# Patient Record
Sex: MALE | NOT HISPANIC OR LATINO | URBAN - METROPOLITAN AREA
[De-identification: names, ages, dates, MRNs, and addresses within clinical notes are randomized per-mention and may not be internally consistent; named-entity substitution may affect disease eponyms.]

---

## 2017-02-27 ENCOUNTER — APPOINTMENT (RX ONLY)
Dept: URBAN - METROPOLITAN AREA CLINIC 37 | Facility: CLINIC | Age: 62
Setting detail: DERMATOLOGY
End: 2017-02-27

## 2017-02-27 ENCOUNTER — APPOINTMENT (RX ONLY)
Dept: URBAN - METROPOLITAN AREA CLINIC 38 | Facility: CLINIC | Age: 62
Setting detail: DERMATOLOGY
End: 2017-02-27

## 2017-02-27 DIAGNOSIS — L81.0 POSTINFLAMMATORY HYPERPIGMENTATION: ICD-10-CM

## 2017-02-27 PROCEDURE — ? RECOMMENDATIONS

## 2017-02-27 PROCEDURE — ? COUNSELING

## 2017-02-27 PROCEDURE — 99202 OFFICE O/P NEW SF 15 MIN: CPT

## 2017-02-27 ASSESSMENT — LOCATION SIMPLE DESCRIPTION DERM
LOCATION SIMPLE: LEFT CHEEK
LOCATION SIMPLE: RIGHT CHEEK
LOCATION SIMPLE: UPPER BACK
LOCATION SIMPLE: RIGHT CHEEK
LOCATION SIMPLE: UPPER BACK
LOCATION SIMPLE: LEFT CHEEK

## 2017-02-27 ASSESSMENT — LOCATION ZONE DERM
LOCATION ZONE: FACE
LOCATION ZONE: FACE
LOCATION ZONE: TRUNK
LOCATION ZONE: TRUNK

## 2017-02-27 ASSESSMENT — LOCATION DETAILED DESCRIPTION DERM
LOCATION DETAILED: LEFT CENTRAL MALAR CHEEK
LOCATION DETAILED: RIGHT INFERIOR CENTRAL MALAR CHEEK
LOCATION DETAILED: RIGHT INFERIOR CENTRAL MALAR CHEEK
LOCATION DETAILED: LEFT CENTRAL MALAR CHEEK
LOCATION DETAILED: SUPERIOR THORACIC SPINE
LOCATION DETAILED: SUPERIOR THORACIC SPINE

## 2017-02-27 NOTE — PROCEDURE: RECOMMENDATIONS
Recommendations (Free Text): We discussed the fact that his skin shows changes in pigment that are consistent with postinflammatory change.  This can happen in the aftermath of a rash, and we discussed the various possibilities.  The PIH is \"seborrheic\" in distribution (central face, upper mid back), but could possibly represent an unusual drug reaction particularly to his blood pressure medicine.  Dr. Mike Bedolla evaluated him with me today, and we discussed a plan to try and help him.  We will obtain the results of his previous biopsies, and based on these results, we may contact his primary care doctor about a possible change in his blood pressure medicine if possible.
Recommendation Preamble: The following details were discussed:
Detail Level: Simple

## 2017-02-27 NOTE — PROCEDURE: MIPS QUALITY
Detail Level: Detailed
Quality 110: Preventive Care And Screening: Influenza Immunization: Influenza immunization was not ordered or administered, reason not given
Quality 226: Preventive Care And Screening: Tobacco Use: Screening And Cessation Intervention: Patient screened for tobacco and is an ex-smoker

## 2017-02-27 NOTE — PROCEDURE: RECOMMENDATIONS
Recommendation Preamble: The following details were discussed:
Detail Level: Simple
Recommendations (Free Text): We discussed the fact that his skin shows changes in pigment that are consistent with postinflammatory change.  This can happen in the aftermath of a rash, and we discussed the various possibilities.  The PIH is \"seborrheic\" in distribution (central face, upper mid back), but could possibly represent an unusual drug reaction particularly to his blood pressure medicine.  Dr. Joseph Dawkins evaluated him with me today, and we discussed a plan to try and help him.  We will obtain the results of his previous biopsies, and based on these results, we may contact his primary care doctor about a possible change in his blood pressure medicine if possible.